# Patient Record
Sex: MALE | Race: WHITE | NOT HISPANIC OR LATINO | Employment: FULL TIME | URBAN - METROPOLITAN AREA
[De-identification: names, ages, dates, MRNs, and addresses within clinical notes are randomized per-mention and may not be internally consistent; named-entity substitution may affect disease eponyms.]

---

## 2018-01-10 LAB
INFLUENZA A ANTIGEN, POC: NEGATIVE
INFLUENZA B ANTIGEN, POC: NEGATIVE
RAPID GROUP A STREP (OHS): NEGATIVE

## 2018-12-04 LAB
INFLUENZA A ANTIGEN, POC: NEGATIVE
INFLUENZA B ANTIGEN, POC: NEGATIVE

## 2021-12-18 LAB
INFLUENZA A ANTIGEN, POC: NEGATIVE
INFLUENZA B ANTIGEN, POC: NEGATIVE
SARS-COV-2 RNA RESP QL NAA+PROBE: NEGATIVE

## 2022-04-11 ENCOUNTER — HISTORICAL (OUTPATIENT)
Dept: ADMINISTRATIVE | Facility: HOSPITAL | Age: 57
End: 2022-04-11

## 2022-04-28 VITALS
WEIGHT: 260 LBS | HEIGHT: 71 IN | OXYGEN SATURATION: 96 % | BODY MASS INDEX: 36.4 KG/M2 | SYSTOLIC BLOOD PRESSURE: 153 MMHG | DIASTOLIC BLOOD PRESSURE: 104 MMHG

## 2022-07-25 ENCOUNTER — OFFICE VISIT (OUTPATIENT)
Dept: URGENT CARE | Facility: CLINIC | Age: 57
End: 2022-07-25
Payer: COMMERCIAL

## 2022-07-25 VITALS
SYSTOLIC BLOOD PRESSURE: 154 MMHG | RESPIRATION RATE: 18 BRPM | BODY MASS INDEX: 37.08 KG/M2 | TEMPERATURE: 99 F | DIASTOLIC BLOOD PRESSURE: 85 MMHG | HEIGHT: 70 IN | OXYGEN SATURATION: 97 % | WEIGHT: 259 LBS | HEART RATE: 85 BPM

## 2022-07-25 DIAGNOSIS — U07.1 COVID-19 VIRUS DETECTED: ICD-10-CM

## 2022-07-25 DIAGNOSIS — J02.9 SORE THROAT: ICD-10-CM

## 2022-07-25 DIAGNOSIS — U07.1 COVID: ICD-10-CM

## 2022-07-25 DIAGNOSIS — R05.9 COUGH: Primary | ICD-10-CM

## 2022-07-25 LAB
CTP QC/QA: YES
CTP QC/QA: YES
S PYO RRNA THROAT QL PROBE: NEGATIVE
SARS-COV-2 RDRP RESP QL NAA+PROBE: POSITIVE

## 2022-07-25 PROCEDURE — 1160F RVW MEDS BY RX/DR IN RCRD: CPT | Mod: CPTII,,, | Performed by: FAMILY MEDICINE

## 2022-07-25 PROCEDURE — 3079F DIAST BP 80-89 MM HG: CPT | Mod: CPTII,,, | Performed by: FAMILY MEDICINE

## 2022-07-25 PROCEDURE — 87880 POCT RAPID STREP A: ICD-10-PCS | Mod: QW,,, | Performed by: FAMILY MEDICINE

## 2022-07-25 PROCEDURE — 1159F PR MEDICATION LIST DOCUMENTED IN MEDICAL RECORD: ICD-10-PCS | Mod: CPTII,,, | Performed by: FAMILY MEDICINE

## 2022-07-25 PROCEDURE — 87880 STREP A ASSAY W/OPTIC: CPT | Mod: QW,,, | Performed by: FAMILY MEDICINE

## 2022-07-25 PROCEDURE — 3008F PR BODY MASS INDEX (BMI) DOCUMENTED: ICD-10-PCS | Mod: CPTII,,, | Performed by: FAMILY MEDICINE

## 2022-07-25 PROCEDURE — 1160F PR REVIEW ALL MEDS BY PRESCRIBER/CLIN PHARMACIST DOCUMENTED: ICD-10-PCS | Mod: CPTII,,, | Performed by: FAMILY MEDICINE

## 2022-07-25 PROCEDURE — 99213 OFFICE O/P EST LOW 20 MIN: CPT | Mod: 25,,, | Performed by: FAMILY MEDICINE

## 2022-07-25 PROCEDURE — 1159F MED LIST DOCD IN RCRD: CPT | Mod: CPTII,,, | Performed by: FAMILY MEDICINE

## 2022-07-25 PROCEDURE — U0002: ICD-10-PCS | Mod: QW,,, | Performed by: FAMILY MEDICINE

## 2022-07-25 PROCEDURE — U0002 COVID-19 LAB TEST NON-CDC: HCPCS | Mod: QW,,, | Performed by: FAMILY MEDICINE

## 2022-07-25 PROCEDURE — 3079F PR MOST RECENT DIASTOLIC BLOOD PRESSURE 80-89 MM HG: ICD-10-PCS | Mod: CPTII,,, | Performed by: FAMILY MEDICINE

## 2022-07-25 PROCEDURE — 3077F PR MOST RECENT SYSTOLIC BLOOD PRESSURE >= 140 MM HG: ICD-10-PCS | Mod: CPTII,,, | Performed by: FAMILY MEDICINE

## 2022-07-25 PROCEDURE — 99213 PR OFFICE/OUTPT VISIT, EST, LEVL III, 20-29 MIN: ICD-10-PCS | Mod: 25,,, | Performed by: FAMILY MEDICINE

## 2022-07-25 PROCEDURE — 3008F BODY MASS INDEX DOCD: CPT | Mod: CPTII,,, | Performed by: FAMILY MEDICINE

## 2022-07-25 PROCEDURE — 3077F SYST BP >= 140 MM HG: CPT | Mod: CPTII,,, | Performed by: FAMILY MEDICINE

## 2022-07-25 RX ORDER — EMPAGLIFLOZIN 25 MG/1
25 TABLET, FILM COATED ORAL DAILY
COMMUNITY
Start: 2022-05-12

## 2022-07-25 RX ORDER — METFORMIN HYDROCHLORIDE 1000 MG/1
1000 TABLET ORAL 2 TIMES DAILY
COMMUNITY
Start: 2022-05-23

## 2022-07-25 RX ORDER — GLIMEPIRIDE 4 MG/1
4 TABLET ORAL 2 TIMES DAILY
COMMUNITY
Start: 2022-06-24

## 2022-07-25 RX ORDER — ROSUVASTATIN CALCIUM 20 MG/1
20 TABLET, COATED ORAL NIGHTLY
COMMUNITY
Start: 2022-05-12

## 2022-07-25 RX ORDER — DULAGLUTIDE 1.5 MG/.5ML
1.5 INJECTION, SOLUTION SUBCUTANEOUS
COMMUNITY
Start: 2021-12-18 | End: 2023-02-14

## 2022-07-25 NOTE — PATIENT INSTRUCTIONS
Medications sent to pharmacy.  As discussed stop taking Crestor.  You can restart it 2 days after finishing Paxlovid  COVID Positive  Start vitamin C 1000mg twice a day, zinc 100mg once a day, and vitamin D3 at least 2000 units a day. Current CDC guidelines recommend that you quarantine for 5 days starting the day after your symptoms began. Quarantine can end after 5 days as long as the last 24 hours of quarantine you are fever free and there is improvement of all your symptoms. Wear a mask around others for 5 additional days after quarantine. Treat your symptoms as you would the common cold. If you live with anybody, isolate yourself in a separate bedroom and use a separate bathroom. If your symptoms worsen or you develop shortness of breath or a fever over 102.5 , seek medical attention immediately.

## 2022-07-25 NOTE — PROGRESS NOTES
"Subjective:       Patient ID: Deacon Fuller is a 57 y.o. male.    Vitals:  height is 5' 10" (1.778 m) and weight is 117.5 kg (259 lb). His oral temperature is 98.7 °F (37.1 °C). His blood pressure is 154/85 (abnormal) and his pulse is 85. His respiration is 18 and oxygen saturation is 97%.     Chief Complaint: Cough (X 3 days ago), Headache (X 3 days ago), sinus pressure (X 3 days ago), and Sore Throat (X 3 days ago)    57-year-old male presents to clinic complaining of cough, headache and sore throat. Symptoms started x 3 days ago. Ibuprofen and OTC sinus medication was taken for symptoms mild relief.  Denies any shortness of breath or diarrhea.  Patient is COVID vaccinated.  Patient is diabetic.    Cough  This is a new problem. Episode onset: x 3 days ago. The problem has been gradually improving. The cough is non-productive. Associated symptoms include headaches and a sore throat. Nothing aggravates the symptoms. Treatments tried: ibuprofen and OTC sinus medication. The treatment provided mild relief.   Headache   This is a new problem. Episode onset: x 3 days ago. The problem has been gradually improving. The pain is located in the temporal (behind eyes) region. The pain does not radiate. Quality: pressure. The pain is at a severity of 3/10. The pain is mild. Associated symptoms include coughing and a sore throat. Nothing aggravates the symptoms. Treatments tried: ibuprofen and OTC sinus medication. The treatment provided mild relief.   Sore Throat   This is a new problem. Episode onset: x 3 days ago. The problem has been gradually improving. There has been no fever. The pain is at a severity of 3/10. The pain is mild. Associated symptoms include coughing and headaches. Treatments tried: ibuprofen and OTC sinus medication. The treatment provided mild relief.       Constitution: Negative.   HENT: Positive for sore throat.    Neck: neck negative.   Cardiovascular: Negative.    Eyes: Negative.    Respiratory: " Positive for cough.    Gastrointestinal: Negative.    Genitourinary: Negative.    Musculoskeletal: Negative.    Skin: Negative.    Allergic/Immunologic: Negative.    Neurological: Positive for headaches.   Hematologic/Lymphatic: Negative.        Objective:      Physical Exam   Constitutional: He is oriented to person, place, and time. He does not appear ill.   HENT:   Head: Normocephalic and atraumatic.   Ears:   Right Ear: External ear normal.   Left Ear: External ear normal.   Eyes: Conjunctivae are normal.   Pulmonary/Chest: Effort normal. No stridor. No respiratory distress. He has no wheezes. He has no rhonchi. He has no rales.   Abdominal: Normal appearance.   Neurological: He is alert and oriented to person, place, and time.   Psychiatric: His behavior is normal. Mood, judgment and thought content normal.   Vitals reviewed.          Previous History      Review of patient's allergies indicates:   Allergen Reactions    Erythromycin Swelling       Past Medical History:   Diagnosis Date    High cholesterol      Current Outpatient Medications   Medication Instructions    glimepiride (AMARYL) 4 mg, Oral, 2 times daily    JARDIANCE 25 mg, Oral, Daily    metFORMIN (GLUCOPHAGE) 1,000 mg, Oral, 2 times daily    MOUNJARO 5 mg/0.5 mL PnIj SMARTSI Milligram(s) SUB-Q Once a Week    nirmatrelvir-ritonavir 300 mg (150 mg x 2)-100 mg copackaged tablets (EUA) Take 3 tablets by mouth 2 (two) times daily. Each dose contains 2 nirmatrelvir (pink tablets) and 1 ritonavir (white tablet). Take all 3 tablets together    rosuvastatin (CRESTOR) 20 mg, Oral, Nightly    TRULICITY 1.5 mg, Subcutaneous     History reviewed. No pertinent surgical history.  History reviewed. No pertinent family history.    Social History     Tobacco Use    Smoking status: Never Smoker    Smokeless tobacco: Never Used   Substance Use Topics    Alcohol use: Not Currently        Physical Exam      Vital Signs Reviewed   BP (!) 154/85 (BP  "Location: Left arm, Patient Position: Sitting)   Pulse 85   Temp 98.7 °F (37.1 °C) (Oral)   Resp 18   Ht 5' 10" (1.778 m)   Wt 117.5 kg (259 lb)   SpO2 97%   BMI 37.16 kg/m²        Procedures    Procedures     Labs     Results for orders placed or performed in visit on 07/25/22   POCT COVID-19 Rapid Screening   Result Value Ref Range    POC Rapid COVID Positive (A) Negative     Acceptable Yes          Assessment:       1. Cough    2. Sore throat    3. COVID          Plan:       Medications sent to pharmacy.  As discussed stop taking Crestor.  You can restart it 2 days after finishing Paxlovid  COVID Positive  Start vitamin C 1000mg twice a day, zinc 100mg once a day, and vitamin D3 at least 2000 units a day. Current CDC guidelines recommend that you quarantine for 5 days starting the day after your symptoms began. Quarantine can end after 5 days as long as the last 24 hours of quarantine you are fever free and there is improvement of all your symptoms. Wear a mask around others for 5 additional days after quarantine. Treat your symptoms as you would the common cold. If you live with anybody, isolate yourself in a separate bedroom and use a separate bathroom. If your symptoms worsen or you develop shortness of breath or a fever over 102.5 , seek medical attention immediately.     Cough  -     POCT COVID-19 Rapid Screening  -     POCT rapid strep A    Sore throat  -     POCT COVID-19 Rapid Screening  -     POCT rapid strep A    COVID    Other orders  -     nirmatrelvir-ritonavir 300 mg (150 mg x 2)-100 mg copackaged tablets (EUA); Take 3 tablets by mouth 2 (two) times daily. Each dose contains 2 nirmatrelvir (pink tablets) and 1 ritonavir (white tablet). Take all 3 tablets together  Dispense: 30 tablet; Refill: 0                     "

## 2022-09-21 ENCOUNTER — HISTORICAL (OUTPATIENT)
Dept: ADMINISTRATIVE | Facility: HOSPITAL | Age: 57
End: 2022-09-21
Payer: COMMERCIAL

## 2023-02-14 PROBLEM — E78.5 HYPERLIPIDEMIA: Status: ACTIVE | Noted: 2023-02-14

## 2023-02-14 PROBLEM — E11.9 TYPE 2 DIABETES MELLITUS WITHOUT COMPLICATION, WITHOUT LONG-TERM CURRENT USE OF INSULIN: Status: ACTIVE | Noted: 2023-02-14

## 2023-02-14 PROBLEM — R10.84 GENERALIZED ABDOMINAL PAIN: Status: ACTIVE | Noted: 2023-02-14

## 2023-02-14 PROBLEM — Z00.00 WELLNESS EXAMINATION: Status: ACTIVE | Noted: 2023-02-14

## 2023-05-22 PROBLEM — Z00.00 WELLNESS EXAMINATION: Status: RESOLVED | Noted: 2023-02-14 | Resolved: 2023-05-22

## 2023-10-02 ENCOUNTER — OFFICE VISIT (OUTPATIENT)
Dept: URGENT CARE | Facility: CLINIC | Age: 58
End: 2023-10-02
Payer: COMMERCIAL

## 2023-10-02 VITALS
WEIGHT: 235 LBS | HEIGHT: 71 IN | RESPIRATION RATE: 17 BRPM | BODY MASS INDEX: 32.9 KG/M2 | HEART RATE: 88 BPM | TEMPERATURE: 99 F | DIASTOLIC BLOOD PRESSURE: 79 MMHG | SYSTOLIC BLOOD PRESSURE: 150 MMHG | OXYGEN SATURATION: 98 %

## 2023-10-02 DIAGNOSIS — L98.9 SKIN LESION: Primary | ICD-10-CM

## 2023-10-02 PROCEDURE — 99213 PR OFFICE/OUTPT VISIT, EST, LEVL III, 20-29 MIN: ICD-10-PCS | Mod: ,,,

## 2023-10-02 PROCEDURE — 99213 OFFICE O/P EST LOW 20 MIN: CPT | Mod: ,,,

## 2023-10-02 RX ORDER — MUPIROCIN 20 MG/G
OINTMENT TOPICAL 3 TIMES DAILY
Qty: 1 G | Refills: 1 | Status: SHIPPED | OUTPATIENT
Start: 2023-10-02 | End: 2023-10-09

## 2023-10-02 RX ORDER — CEPHALEXIN 500 MG/1
500 CAPSULE ORAL EVERY 6 HOURS
Qty: 28 CAPSULE | Refills: 0 | Status: SHIPPED | OUTPATIENT
Start: 2023-10-02 | End: 2023-10-09

## 2023-10-02 NOTE — PATIENT INSTRUCTIONS
Start the antibiotic today. Take all of the the medication even if symptoms improve. Take with food   Wound Care: Twice daily wound care as discussed. Wash with dial soap or baby soap and pat dry, apply the ointment and a non-stick dressing.   Pain: Take OTC Tylenol or Ibuprofen per package instructions as needed for pain.  Loosen the bandage if needed.   Follow up with your Primary Care Provider or return to the clinic within 2-4 days for a recheck.   Present to the Emergency Department for any significant change or worsening symptoms including worsening redness, swelling, purulent discharge, fever, body aches, or chills.

## 2023-10-02 NOTE — PROGRESS NOTES
"Subjective:      Patient ID: Deacon Fuller is a 58 y.o. male.    Vitals:  height is 5' 11" (1.803 m) and weight is 106.6 kg (235 lb). His temperature is 99.1 °F (37.3 °C). His blood pressure is 150/79 (abnormal) and his pulse is 88. His respiration is 17 and oxygen saturation is 98%.     Chief Complaint: sore  ( Patient is a 58 y.o. male who presents to urgent care with complaints of a sore on left inner lower leg after a possible bug bite x over a week  .  Patient denies fever or drainage. )    A 57 y/o male with a PMH of T2DM, and HLD presents to the clinic with c/o a circular lesion to the inside of the right calf x 1 week. He reports a possible insect bite when he was driving a week ago he felt a stinging pain but did not see the insect. He also has a healing lesion to the left dorsal hand. He denies any fever, body aches, chills, n/v/d, drainage from the wound, severe pain, numbness, tingling or excessive warmth to the extremity. He does report a history of cellulitis/staph in the past.       Constitution: Negative for chills, fatigue and fever.   HENT: Negative.     Neck: neck negative.   Eyes: Negative.    Respiratory: Negative.     Gastrointestinal: Negative.    Genitourinary: Negative.    Skin:  Positive for lesion.      Objective:     Physical Exam   Constitutional: He is oriented to person, place, and time. He appears well-developed. He is cooperative.  Non-toxic appearance. He does not appear ill. No distress.   HENT:   Head: Normocephalic and atraumatic.   Ears:   Right Ear: Hearing and external ear normal.   Left Ear: Hearing and external ear normal.   Mouth/Throat: Mucous membranes are normal. Mucous membranes are moist. Oropharynx is clear.   Eyes: Conjunctivae and lids are normal.   Neck: Trachea normal and phonation normal. Neck supple. No edema present. No erythema present. No neck rigidity present.   Cardiovascular: Normal rate and regular rhythm.   Pulmonary/Chest: Effort normal and breath " sounds normal. No stridor. He has no decreased breath sounds.   Abdominal: Normal appearance.   Neurological: He is alert and oriented to person, place, and time. He exhibits normal muscle tone.   Skin: Skin is warm, intact and not diaphoretic. lesion (there is an approximatly 2cm circular lesion noted to the inside of the middle of the right calf, erythema noted, granulation tissue noted to the inner wound bed, patient has been using peroxide as a cleanser; no induration/fluctuance; mild TTP)         Comments: There is a small slightly erythremic healing lesion noted to the dorsal hand near the thumb    Psychiatric: His speech is normal and behavior is normal. Mood and thought content normal.   Nursing note and vitals reviewed.      Assessment:     1. Skin lesion        Plan:       Skin lesion    Other orders  -     cephALEXin (KEFLEX) 500 MG capsule; Take 1 capsule (500 mg total) by mouth every 6 (six) hours. for 7 days  Dispense: 28 capsule; Refill: 0  -     mupirocin (BACTROBAN) 2 % ointment; Apply topically 3 (three) times daily. for 7 days  Dispense: 1 g; Refill: 1      Start the antibiotic today. Take all of the the medication even if symptoms improve. Take with food   Wound Care: Twice daily wound care as discussed. Wash with dial soap or baby soap and pat dry, apply the ointment and a non-stick dressing. Stop using peroxide to clean the wound.   Pain: Take OTC Tylenol or Ibuprofen per package instructions as needed for pain.  Loosen the bandage if needed.   Follow up with your Primary Care Provider or return to the clinic within 2-4 days for a recheck.   Present to the Emergency Department for any significant change or worsening symptoms including worsening redness, swelling, purulent discharge, fever, body aches, or chills.

## 2023-12-02 ENCOUNTER — OFFICE VISIT (OUTPATIENT)
Dept: URGENT CARE | Facility: CLINIC | Age: 58
End: 2023-12-02
Payer: COMMERCIAL

## 2023-12-02 VITALS
HEART RATE: 77 BPM | SYSTOLIC BLOOD PRESSURE: 137 MMHG | HEIGHT: 71 IN | RESPIRATION RATE: 17 BRPM | TEMPERATURE: 98 F | WEIGHT: 241 LBS | DIASTOLIC BLOOD PRESSURE: 87 MMHG | OXYGEN SATURATION: 98 % | BODY MASS INDEX: 33.74 KG/M2

## 2023-12-02 DIAGNOSIS — S69.92XA JAMMED FINGER (INTERPHALANGEAL JOINT), LEFT, INITIAL ENCOUNTER: Primary | ICD-10-CM

## 2023-12-02 PROCEDURE — 99214 PR OFFICE/OUTPT VISIT, EST, LEVL IV, 30-39 MIN: ICD-10-PCS | Mod: ,,,

## 2023-12-02 PROCEDURE — 99214 OFFICE O/P EST MOD 30 MIN: CPT | Mod: ,,,

## 2023-12-02 NOTE — PROGRESS NOTES
"Subjective:      Patient ID: Deacon Fuller is a 58 y.o. male.    Vitals:  height is 5' 11" (1.803 m) and weight is 109.3 kg (241 lb). His temperature is 98.3 °F (36.8 °C). His blood pressure is 137/87 and his pulse is 77. His respiration is 17 and oxygen saturation is 98%.     Chief Complaint: finger pain     Patient is a 58 y.o. male who presents to urgent care with complaints of left pointer finger swollen and tender after hit door weeks ago. Alleviating factors include naproxen with no relief. Patient denies bruising.     Finger Pain  Associated symptoms include arthralgias (Left hand 2nd digit) and joint swelling (left hand 2nd digit). Pertinent negatives include no chest pain, fatigue, fever, myalgias or neck pain.     Constitution: Negative for fatigue and fever.   HENT:  Negative for facial trauma.    Neck: Negative for neck pain.   Cardiovascular:  Negative for chest trauma, chest pain, leg swelling, palpitations, sob on exertion and passing out.   Genitourinary:  Negative for dysuria and frequency.   Musculoskeletal:  Positive for joint pain (Left hand 2nd digit) and joint swelling (left hand 2nd digit). Negative for abnormal ROM of joint, arthritis, gout, muscle cramps and muscle ache.   Skin:  Negative for color change, pale, wound, laceration, lesion, skin thickening/induration, puncture wound, erythema, bruising, abscess, avulsion and hives.   Allergic/Immunologic: Negative for hives.      Objective:     Physical Exam   Constitutional: He is oriented to person, place, and time. He appears well-developed. He is cooperative.   HENT:   Head: Normocephalic and atraumatic.   Ears:   Right Ear: Hearing, tympanic membrane, external ear and ear canal normal.   Left Ear: Hearing, tympanic membrane, external ear and ear canal normal.   Nose: Nose normal. No mucosal edema or nasal deformity. No epistaxis. Right sinus exhibits no maxillary sinus tenderness and no frontal sinus tenderness. Left sinus exhibits no " maxillary sinus tenderness and no frontal sinus tenderness.   Mouth/Throat: Uvula is midline, oropharynx is clear and moist and mucous membranes are normal. No trismus in the jaw. Normal dentition. No uvula swelling.   Eyes: Conjunctivae and lids are normal.   Neck: Trachea normal and phonation normal. Neck supple.   Cardiovascular: Normal rate, regular rhythm, normal heart sounds and normal pulses.   Pulmonary/Chest: Effort normal and breath sounds normal.   Abdominal: Normal appearance and bowel sounds are normal. Soft.   Musculoskeletal: Normal range of motion.         General: Normal range of motion.      Right hand: He exhibits normal capillary refill. Motor /Testing: The patient has normal right wrist strength.      Left hand: He exhibits normal capillary refill. Left index finger: Exhibits tenderness and swelling. There is tenderness of the PIP joint. Motor /Testing: The patient has normal left wrist strength.   Neurological: He is alert and oriented to person, place, and time. He exhibits normal muscle tone.   Skin: Skin is warm, dry and intact. No erythema   Psychiatric: His speech is normal and behavior is normal. Judgment and thought content normal.   Nursing note and vitals reviewed.      Assessment:     1. Jammed finger (interphalangeal joint), left, initial encounter        Plan:       Jammed finger (interphalangeal joint), left, initial encounter  -     X-Ray Finger 2 or More Views Left; Future; Expected date: 12/02/2023      Two x-ray showed no acute fractures of left hand 2nd digit.    Patient can take OTC ibuprofen as directed for pain and inflammation.  Patient provided finger splint at today's visit.  Patient informed of x-ray results.  All answers and questions were discussed with the patient.    Drink plenty of fluids    Get plenty of rest.    Follow-up with your Primary Care Provider as needed.      Present to the Emergency Department with any significant change or worsening symptoms.

## 2024-07-27 ENCOUNTER — OFFICE VISIT (OUTPATIENT)
Dept: URGENT CARE | Facility: CLINIC | Age: 59
End: 2024-07-27
Payer: COMMERCIAL

## 2024-07-27 VITALS
HEIGHT: 71 IN | RESPIRATION RATE: 18 BRPM | DIASTOLIC BLOOD PRESSURE: 79 MMHG | WEIGHT: 229 LBS | BODY MASS INDEX: 32.06 KG/M2 | HEART RATE: 77 BPM | TEMPERATURE: 98 F | SYSTOLIC BLOOD PRESSURE: 149 MMHG | OXYGEN SATURATION: 96 %

## 2024-07-27 DIAGNOSIS — M79.5 FOREIGN BODY (FB) IN SOFT TISSUE: Primary | ICD-10-CM

## 2024-07-27 PROCEDURE — 99213 OFFICE O/P EST LOW 20 MIN: CPT | Mod: ,,, | Performed by: NURSE PRACTITIONER

## 2024-07-27 RX ORDER — MUPIROCIN 20 MG/G
OINTMENT TOPICAL 2 TIMES DAILY
Qty: 22 G | Refills: 1 | Status: SHIPPED | OUTPATIENT
Start: 2024-07-27 | End: 2024-08-03

## 2024-07-27 RX ORDER — SULFAMETHOXAZOLE AND TRIMETHOPRIM 800; 160 MG/1; MG/1
1 TABLET ORAL 2 TIMES DAILY
Qty: 14 TABLET | Refills: 0 | Status: SHIPPED | OUTPATIENT
Start: 2024-07-27 | End: 2024-08-03

## 2024-07-27 NOTE — PATIENT INSTRUCTIONS
Begin taking antibiotics today with food and take as directed until completion   Apply Bactroban ointment to any open or concerning areas   Keep hands as clean as possible with antibacterial soap monitor for any worsening symptoms and be re-evaluated if areas become more painful swollen or you develop any questionable worsening of infectious like symptoms.

## 2024-07-27 NOTE — PROGRESS NOTES
"Subjective:      Patient ID: Deacon Fuller is a 59 y.o. male.    Vitals:  height is 5' 11" (1.803 m) and weight is 103.9 kg (229 lb). His temperature is 98.1 °F (36.7 °C). His blood pressure is 149/79 (abnormal) and his pulse is 77. His respiration is 18 and oxygen saturation is 96%.     Chief Complaint: Foreign Body in Skin     Patient is a 59 y.o. male who presents to urgent care with complaints of poss. Cactus pricks in hands, discharge from fingers x1 week. Alleviating factors include triple antibiotic ointment with mild amount of relief.    ROS   Objective:     Physical Exam   Constitutional: He is oriented to person, place, and time. He appears well-developed. He is cooperative.  Non-toxic appearance. He does not appear ill. No distress.   HENT:   Head: Normocephalic and atraumatic.   Ears:   Right Ear: Hearing, tympanic membrane, external ear and ear canal normal.   Left Ear: Hearing, tympanic membrane, external ear and ear canal normal.   Nose: Nose normal. No mucosal edema, rhinorrhea or nasal deformity. No epistaxis. Right sinus exhibits no maxillary sinus tenderness and no frontal sinus tenderness. Left sinus exhibits no maxillary sinus tenderness and no frontal sinus tenderness.   Mouth/Throat: Uvula is midline, oropharynx is clear and moist and mucous membranes are normal. No trismus in the jaw. Normal dentition. No uvula swelling. No oropharyngeal exudate, posterior oropharyngeal edema or posterior oropharyngeal erythema.   Eyes: Conjunctivae and lids are normal. No scleral icterus.   Neck: Trachea normal and phonation normal. Neck supple. No edema present. No erythema present. No neck rigidity present.   Cardiovascular: Normal rate, regular rhythm, normal heart sounds and normal pulses.   Pulmonary/Chest: Effort normal and breath sounds normal. No respiratory distress. He has no decreased breath sounds. He has no rhonchi.   Abdominal: Normal appearance.   Musculoskeletal: Normal range of motion.    "      General: No deformity. Normal range of motion.   Neurological: He is alert and oriented to person, place, and time. He exhibits normal muscle tone. Coordination normal.   Skin: Skin is warm, dry, intact, not diaphoretic and not pale.   Psychiatric: His speech is normal and behavior is normal. Judgment and thought content normal.   Nursing note and vitals reviewed.      Assessment:     1. Foreign body (FB) in soft tissue        Plan:   Begin taking antibiotics today with food and take as directed until completion   Apply Bactroban ointment to any open or concerning areas   Keep hands as clean as possible with antibacterial soap monitor for any worsening symptoms and be re-evaluated if areas become more painful swollen or you develop any questionable worsening of infectious like symptoms.    Foreign body (FB) in soft tissue    Other orders  -     sulfamethoxazole-trimethoprim 800-160mg (BACTRIM DS) 800-160 mg Tab; Take 1 tablet by mouth 2 (two) times daily. for 7 days  Dispense: 14 tablet; Refill: 0  -     mupirocin (BACTROBAN) 2 % ointment; Apply topically 2 (two) times daily. for 7 days  Dispense: 22 g; Refill: 1

## 2024-11-16 ENCOUNTER — OFFICE VISIT (OUTPATIENT)
Dept: URGENT CARE | Facility: CLINIC | Age: 59
End: 2024-11-16
Payer: COMMERCIAL

## 2024-11-16 VITALS
BODY MASS INDEX: 32.48 KG/M2 | SYSTOLIC BLOOD PRESSURE: 133 MMHG | DIASTOLIC BLOOD PRESSURE: 80 MMHG | OXYGEN SATURATION: 98 % | WEIGHT: 232 LBS | RESPIRATION RATE: 16 BRPM | HEART RATE: 85 BPM | TEMPERATURE: 98 F | HEIGHT: 71 IN

## 2024-11-16 DIAGNOSIS — J06.9 ACUTE URI: Primary | ICD-10-CM

## 2024-11-16 PROCEDURE — 96372 THER/PROPH/DIAG INJ SC/IM: CPT | Mod: ,,, | Performed by: PHYSICIAN ASSISTANT

## 2024-11-16 PROCEDURE — 99213 OFFICE O/P EST LOW 20 MIN: CPT | Mod: 25,,, | Performed by: PHYSICIAN ASSISTANT

## 2024-11-16 RX ORDER — PROMETHAZINE HYDROCHLORIDE AND DEXTROMETHORPHAN HYDROBROMIDE 6.25; 15 MG/5ML; MG/5ML
5 SYRUP ORAL EVERY 8 HOURS PRN
Qty: 118 ML | Refills: 0 | Status: SHIPPED | OUTPATIENT
Start: 2024-11-16 | End: 2024-11-21

## 2024-11-16 RX ORDER — BETAMETHASONE SODIUM PHOSPHATE AND BETAMETHASONE ACETATE 3; 3 MG/ML; MG/ML
9 INJECTION, SUSPENSION INTRA-ARTICULAR; INTRALESIONAL; INTRAMUSCULAR; SOFT TISSUE
Status: COMPLETED | OUTPATIENT
Start: 2024-11-16 | End: 2024-11-16

## 2024-11-16 RX ADMIN — BETAMETHASONE SODIUM PHOSPHATE AND BETAMETHASONE ACETATE 9 MG: 3; 3 INJECTION, SUSPENSION INTRA-ARTICULAR; INTRALESIONAL; INTRAMUSCULAR; SOFT TISSUE at 01:11
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
## 2024-11-16 NOTE — PATIENT INSTRUCTIONS
High concern for viral upper respiratory illness today.       Recommend alternate Tylenol and ibuprofen every 4-6 hours if needed for aches pains fever chills.  Recommend daily non sedating antihistamine Claritin Zyrtec or loratadine over the next 1-2 weeks for mild-to-moderate nasal allergies with Benadryl nightly if needed for severe nasal allergies.  Recommend Sudafed or Coricidin decongestant over the next week if needed for nasal congestion and sinus pressure with 3 day limited Afrin or Denis-Synephrine nasal spray.  Phenergan DM sparingly lowest dose if needed for severe cough cold congestion body aches and rest.  Do not take sedating medication drive or operate machinery.     Recommend follow-up with primary care physician in 1-2 weeks for re-evaluation if not improving.

## 2025-01-17 PROBLEM — Z23 IMMUNIZATION DUE: Status: ACTIVE | Noted: 2025-01-17

## 2025-01-17 PROBLEM — E78.00 HYPERCHOLESTEROLEMIA: Status: ACTIVE | Noted: 2023-02-14

## 2025-01-17 PROBLEM — Z12.5 PROSTATE CANCER SCREENING: Status: ACTIVE | Noted: 2025-01-17

## 2025-01-20 PROBLEM — E66.01 SEVERE OBESITY (BMI 35.0-39.9) WITH COMORBIDITY: Status: ACTIVE | Noted: 2025-01-20

## 2025-03-28 ENCOUNTER — OFFICE VISIT (OUTPATIENT)
Dept: URGENT CARE | Facility: CLINIC | Age: 60
End: 2025-03-28
Payer: COMMERCIAL

## 2025-03-28 VITALS
SYSTOLIC BLOOD PRESSURE: 128 MMHG | TEMPERATURE: 99 F | WEIGHT: 252 LBS | HEART RATE: 82 BPM | OXYGEN SATURATION: 99 % | BODY MASS INDEX: 35.28 KG/M2 | RESPIRATION RATE: 20 BRPM | DIASTOLIC BLOOD PRESSURE: 79 MMHG | HEIGHT: 71 IN

## 2025-03-28 DIAGNOSIS — R05.9 COUGH, UNSPECIFIED TYPE: Primary | ICD-10-CM

## 2025-03-28 DIAGNOSIS — J06.9 VIRAL URI: ICD-10-CM

## 2025-03-28 LAB
CTP QC/QA: YES
CTP QC/QA: YES
POC MOLECULAR INFLUENZA A AGN: NEGATIVE
POC MOLECULAR INFLUENZA B AGN: NEGATIVE
SARS CORONAVIRUS 2 ANTIGEN: NEGATIVE

## 2025-03-28 RX ORDER — PREDNISONE 20 MG/1
20 TABLET ORAL 2 TIMES DAILY
Qty: 10 TABLET | Refills: 0 | Status: SHIPPED | OUTPATIENT
Start: 2025-03-28 | End: 2025-04-02

## 2025-03-28 NOTE — PROGRESS NOTES
"Subjective:      Patient ID: Deacon Fuller is a 59 y.o. male.    Vitals:  height is 5' 11" (1.803 m) and weight is 114.3 kg (252 lb). His tympanic temperature is 98.7 °F (37.1 °C). His blood pressure is 128/79 and his pulse is 82. His respiration is 20 and oxygen saturation is 99%.     Chief Complaint: Cough     Patient is a 59 y.o. male who presents to urgent care with complaints of cough, congestion, HA  onset 6 days ago Alleviating factors include cough and allergy with no relief. Patient denies N/V/D, fever, shortness of breath.  Patient states symptoms started after mowing his lawn 6 days ago.      Cough  Associated symptoms include postnasal drip.     HENT:  Positive for postnasal drip, sinus pain and sinus pressure.    Respiratory:  Positive for cough.       Objective:     Physical Exam   Constitutional: He is oriented to person, place, and time. He appears well-developed. He is cooperative.  Non-toxic appearance. He does not appear ill. No distress.   HENT:   Head: Normocephalic and atraumatic.   Ears:   Right Ear: Hearing, tympanic membrane, external ear and ear canal normal.   Left Ear: Hearing, tympanic membrane, external ear and ear canal normal.   Nose: Nose normal. No mucosal edema, rhinorrhea or nasal deformity. No epistaxis. Right sinus exhibits no maxillary sinus tenderness and no frontal sinus tenderness. Left sinus exhibits no maxillary sinus tenderness and no frontal sinus tenderness.   Mouth/Throat: Uvula is midline, oropharynx is clear and moist and mucous membranes are normal. No trismus in the jaw. Normal dentition. No uvula swelling. No oropharyngeal exudate, posterior oropharyngeal edema or posterior oropharyngeal erythema.   Eyes: Conjunctivae and lids are normal. No scleral icterus.   Neck: Trachea normal and phonation normal. Neck supple. No edema present. No erythema present. No neck rigidity present.   Cardiovascular: Normal rate, regular rhythm, normal heart sounds and normal pulses. " "  Pulmonary/Chest: Effort normal and breath sounds normal. No respiratory distress. He has no decreased breath sounds. He has no rhonchi.   Abdominal: Normal appearance.   Musculoskeletal: Normal range of motion.         General: No deformity. Normal range of motion.   Neurological: He is alert and oriented to person, place, and time. He exhibits normal muscle tone. Coordination normal.   Skin: Skin is warm, dry, intact, not diaphoretic and not pale.   Psychiatric: His speech is normal and behavior is normal. Judgment and thought content normal.   Nursing note and vitals reviewed.         Previous History      Review of patient's allergies indicates:   Allergen Reactions    Erythromycin Swelling       Past Medical History:   Diagnosis Date    Diabetes mellitus, type 2     High cholesterol      Current Outpatient Medications   Medication Instructions    cholecalciferol (vitamin D3) 50,000 Units, Every 7 days    glimepiride (AMARYL) 4 mg, 2 times daily    JARDIANCE 25 mg, Daily    metFORMIN (GLUCOPHAGE) 1,000 mg, 2 times daily    MOUNJARO 12.5 mg, Every 7 days    predniSONE (DELTASONE) 20 mg, Oral, 2 times daily    rosuvastatin (CRESTOR) 20 mg, Nightly     Past Surgical History:   Procedure Laterality Date    COLONOSCOPY W/ BIOPSIES AND POLYPECTOMY  07/22/2019    5 year/Dr. Isauro Phelan    HIATAL HERNIA REPAIR      INGUINAL HERNIA REPAIR Bilateral     As child    LAPAROSCOPIC CHOLECYSTECTOMY       Family History   Problem Relation Name Age of Onset    Asthma Mother  83    Liver cancer Father      Colon cancer Father  52    Valvular heart disease Sister      Headaches Sister      Liver cancer Sister  41    Coronary artery disease Brother          CABG x 5    Diabetes Brother         Social History[1]     Physical Exam      Vital Signs Reviewed   /79   Pulse 82   Temp 98.7 °F (37.1 °C) (Tympanic)   Resp 20   Ht 5' 11" (1.803 m)   Wt 114.3 kg (252 lb)   SpO2 99%   BMI 35.15 kg/m²        Procedures  "   Procedures     Labs     Results for orders placed or performed in visit on 03/28/25   POCT Influenza A/B Molecular    Collection Time: 03/28/25  5:19 PM   Result Value Ref Range    POC Molecular Influenza A Ag Negative Negative    POC Molecular Influenza B Ag Negative Negative     Acceptable Yes    SARS Coronavirus 2 Antigen, POCT Manual Read    Collection Time: 03/28/25  5:19 PM   Result Value Ref Range    SARS Coronavirus 2 Antigen Negative Negative, Presumptive Negative     Acceptable Yes       Assessment:     1. Cough, unspecified type    2. Viral URI      His physical exam is benign  Plan:    Drink plenty of fluids.     Get plenty of rest.     Tylenol or Motrin as needed.     Go to the ER with any significant change or worsening of symptoms.     Follow up with your primary care doctor.      Cough, unspecified type  -     POCT Influenza A/B Molecular  -     SARS Coronavirus 2 Antigen, POCT Manual Read    Viral URI  -     predniSONE (DELTASONE) 20 MG tablet; Take 1 tablet (20 mg total) by mouth 2 (two) times daily. for 5 days  Dispense: 10 tablet; Refill: 0                         [1]   Social History  Tobacco Use    Smoking status: Never     Passive exposure: Never    Smokeless tobacco: Never   Substance Use Topics    Alcohol use: Not Currently    Drug use: Never

## 2025-08-19 ENCOUNTER — OFFICE VISIT (OUTPATIENT)
Dept: URGENT CARE | Facility: CLINIC | Age: 60
End: 2025-08-19
Payer: COMMERCIAL

## 2025-08-19 VITALS
SYSTOLIC BLOOD PRESSURE: 142 MMHG | TEMPERATURE: 98 F | WEIGHT: 230 LBS | HEIGHT: 71 IN | DIASTOLIC BLOOD PRESSURE: 62 MMHG | RESPIRATION RATE: 20 BRPM | BODY MASS INDEX: 32.2 KG/M2 | OXYGEN SATURATION: 98 % | HEART RATE: 76 BPM

## 2025-08-19 DIAGNOSIS — H10.9 CONJUNCTIVITIS OF RIGHT EYE, UNSPECIFIED CONJUNCTIVITIS TYPE: Primary | ICD-10-CM

## 2025-08-19 PROCEDURE — 99213 OFFICE O/P EST LOW 20 MIN: CPT | Mod: ,,, | Performed by: PHYSICIAN ASSISTANT

## 2025-08-19 RX ORDER — GENTAMICIN SULFATE 3 MG/ML
2 SOLUTION/ DROPS OPHTHALMIC EVERY 4 HOURS
Qty: 5 ML | Refills: 0 | Status: SHIPPED | OUTPATIENT
Start: 2025-08-19 | End: 2025-08-23